# Patient Record
Sex: MALE | Race: BLACK OR AFRICAN AMERICAN
[De-identification: names, ages, dates, MRNs, and addresses within clinical notes are randomized per-mention and may not be internally consistent; named-entity substitution may affect disease eponyms.]

---

## 2017-03-20 ENCOUNTER — HOSPITAL ENCOUNTER (INPATIENT)
Dept: HOSPITAL 23 - P2S | Age: 22
LOS: 2 days | DRG: 897 | End: 2017-03-22
Admitting: PSYCHIATRY & NEUROLOGY
Payer: COMMERCIAL

## 2017-03-20 DIAGNOSIS — G89.29: ICD-10-CM

## 2017-03-20 DIAGNOSIS — F39: ICD-10-CM

## 2017-03-20 DIAGNOSIS — F11.20: Primary | ICD-10-CM

## 2017-03-20 DIAGNOSIS — M54.9: ICD-10-CM

## 2017-03-20 DIAGNOSIS — F32.9: ICD-10-CM

## 2017-03-20 PROCEDURE — HZ2ZZZZ DETOXIFICATION SERVICES FOR SUBSTANCE ABUSE TREATMENT: ICD-10-PCS | Performed by: PSYCHIATRY & NEUROLOGY

## 2017-03-21 LAB
BARBITURATES UR QL SCN: 0.2 MG/DL (ref 0.2–2)
BARBITURATES UR QL SCN: 3.9 G/DL (ref 3.5–5)
BASOPHIL#: 0 X10E3 (ref 0–0.3)
BASOPHIL%: 0.3 % (ref 0–2.5)
BENZODIAZ UR QL SCN: 14 U/L (ref 10–40)
BENZODIAZ UR QL SCN: 15 U/L (ref 10–42)
BLOOD UREA NITROGEN: 10 MG/DL (ref 9–23)
BUN/CREATININE RATIO: 9.09
BZE UR QL SCN: 73 U/L (ref 32–92)
CALCIUM SERUM: 9.7 MG/DL (ref 8.4–10.2)
CK MB SERPL-RTO: 13.2 % (ref 11–15.5)
CK MB SERPL-RTO: 33.3 G/DL (ref 30–36)
CREATININE SERUM: 1.1 MG/DL (ref 0.6–1.4)
DIFF IND: NO
EOSINOPHIL#: 0.1 X10E3 (ref 0–0.7)
EOSINOPHIL%: 1 % (ref 0–7)
GLOM FILT RATE ESTIMATED: (no result) ML/MIN (ref 60–?)
GLUCOSE FASTING: 95 MG/DL (ref 70–110)
HEMATOCRIT: 42.3 % (ref 38–50)
HEMOGLOBIN: 14.1 GM/DL (ref 13–16)
KETONES UR QL: 105 MMOL/L (ref 100–111)
KETONES UR QL: 29 MMOL/L (ref 22–31)
LYMPHOCYTE#: 1.8 X10E3 (ref 1–3.5)
LYMPHOCYTE%: 22.8 % (ref 17–45)
MEAN CELL VOLUME: 91.2 FL (ref 83–96)
MEAN CORPUSCULAR HEMOGLOBIN: 30.4 PG (ref 28–34)
MEAN PLATELET VOLUME: 8 FL (ref 6.5–11.5)
MONOCYTE#: 1.5 X10E3 (ref 0–1)
MONOCYTE%: 18.1 % (ref 3–12)
NEUTROPHIL#: 4.7 X10E3 (ref 1.5–7.1)
NEUTROPHIL%: 57.8 % (ref 40–75)
PLATELET COUNT: 314 X10E3 (ref 140–420)
POTASSIUM: 4.4 MMOL/L (ref 3.5–5.1)
PROTEIN TOTAL SERUM: 6.7 G/DL (ref 6–8.3)
RED BLOOD COUNT: 4.64 X10E (ref 3.9–5.6)
SODIUM: 139 MMOL/L (ref 135–145)
WHITE BLOOD COUNT: 8.1 X10E3 (ref 4–10.5)

## 2017-05-02 ENCOUNTER — HOSPITAL ENCOUNTER (INPATIENT)
Dept: HOSPITAL 23 - P1E | Age: 22
LOS: 2 days | DRG: 885 | End: 2017-05-04
Admitting: PSYCHIATRY & NEUROLOGY
Payer: COMMERCIAL

## 2017-05-02 DIAGNOSIS — F11.20: ICD-10-CM

## 2017-05-02 DIAGNOSIS — F32.9: ICD-10-CM

## 2017-05-02 DIAGNOSIS — F39: Primary | ICD-10-CM

## 2017-05-02 PROCEDURE — HZ2ZZZZ DETOXIFICATION SERVICES FOR SUBSTANCE ABUSE TREATMENT: ICD-10-PCS | Performed by: PSYCHIATRY & NEUROLOGY

## 2017-05-03 LAB
ANISOCYTOSIS: (no result)
BARBITURATES UR QL SCN: 0.4 MG/DL (ref 0.2–2)
BARBITURATES UR QL SCN: 3.9 G/DL (ref 3.5–5)
BASOPHIL#: 0 X10E3 (ref 0–0.3)
BASOPHIL%: 0.4 % (ref 0–2.5)
BENZODIAZ UR QL SCN: 42 U/L (ref 10–42)
BENZODIAZ UR QL SCN: 43 U/L (ref 10–40)
BLOOD UREA NITROGEN: 12 MG/DL (ref 9–23)
BUN/CREATININE RATIO: 10.9
BZE UR QL SCN: 71 U/L (ref 32–92)
CALCIUM SERUM: 9.5 MG/DL (ref 8.4–10.2)
CK MB SERPL-RTO: 13.7 % (ref 11–15.5)
CK MB SERPL-RTO: 32.7 G/DL (ref 30–36)
CREATININE SERUM: 1.1 MG/DL (ref 0.6–1.4)
DIFF IND: YES
EOSINOPHIL#: 0.1 X10E3 (ref 0–0.7)
EOSINOPHIL%: 2.6 % (ref 0–7)
EOSINOPHIL: 1 % (ref 0–7)
GLOM FILT RATE ESTIMATED: 110.7 ML/MIN (ref 60–?)
GLUCOSE FASTING: 139 MG/DL (ref 70–110)
HEMATOCRIT: 43.7 % (ref 38–50)
HEMOGLOBIN: 14.3 GM/DL (ref 13–16)
KETONES UR QL: 105 MMOL/L (ref 100–111)
KETONES UR QL: 27 MMOL/L (ref 22–31)
LYMPHOCYTE#: 1.6 X10E3 (ref 1–3.5)
LYMPHOCYTE%: 40.1 % (ref 17–45)
LYMPHOCYTE: 40 % (ref 17–50)
MEAN CELL VOLUME: 92.2 FL (ref 83–96)
MEAN CORPUSCULAR HEMOGLOBIN: 30.2 PG (ref 28–34)
MEAN PLATELET VOLUME: 8.5 FL (ref 6.5–11.5)
MONOCYTE#: 0.8 X10E3 (ref 0–1)
MONOCYTE%: 20.8 % (ref 3–12)
MONOCYTE: 26 % (ref 3–12)
NEUTROPHIL#: 1.5 X10E3 (ref 1.5–7.1)
NEUTROPHIL%: 36.1 % (ref 40–75)
NEUTROPHIL: 33 % (ref 40–75)
PLATELET COUNT: 218 X10E3 (ref 140–420)
PLATELET ESTIMATE: NORMAL
POTASSIUM: 4.1 MMOL/L (ref 3.5–5.1)
PROTEIN TOTAL SERUM: 6.5 G/DL (ref 6–8.3)
RBC NORMAL: YES
RED BLOOD COUNT: 4.74 X10E (ref 3.9–5.6)
SODIUM: 140 MMOL/L (ref 135–145)
WHITE BLOOD COUNT: 4 X10E3 (ref 4–10.5)

## 2017-05-04 LAB
BARBITURATES: (no result)
BENZODIAZEPINES: (no result)
COCAINE: (no result)
DX ICD CODE: (no result)
DX ICD CODE: (no result)
OPIATES: (no result)
TRICYCLIC ANTIDEPRESSANTS: (no result)
U METHADONE: (no result)
URINE APPEARANCE: CLEAR
URINE BILIRUBIN: (no result)
URINE BLOOD: (no result)
URINE COLOR: YELLOW
URINE GLUCOSE: (no result) MG/DL
URINE KETONE: (no result)
URINE LEUKOCYTE ESTERASE: (no result)
URINE NITRATE: (no result)
URINE PH: 8.5 (ref 5–8)
URINE PROTEIN: (no result)
URINE SOURCE: (no result)
URINE SPECIFIC GRAVITY: 1.02 (ref 1–1.03)
URINE UROBILINOGEN: 0.2 MG/DL

## 2017-07-11 ENCOUNTER — HOSPITAL ENCOUNTER (INPATIENT)
Dept: HOSPITAL 23 - P1E | Age: 22
LOS: 3 days | Discharge: LEFT BEFORE DISCHARGE | DRG: 894 | End: 2017-07-14
Attending: SPECIALIST | Admitting: SPECIALIST
Payer: COMMERCIAL

## 2017-07-11 DIAGNOSIS — F17.210: ICD-10-CM

## 2017-07-11 DIAGNOSIS — F11.24: ICD-10-CM

## 2017-07-11 DIAGNOSIS — F11.23: Primary | ICD-10-CM

## 2017-07-11 DIAGNOSIS — F13.20: ICD-10-CM

## 2017-07-11 PROCEDURE — HZ2ZZZZ DETOXIFICATION SERVICES FOR SUBSTANCE ABUSE TREATMENT: ICD-10-PCS | Performed by: SPECIALIST

## 2017-07-12 LAB
BARBITURATES UR QL SCN: 0.5 MG/DL (ref 0.2–2)
BARBITURATES UR QL SCN: 4.3 G/DL (ref 3.5–5)
BASOPHIL#: 0 X10E3 (ref 0–0.3)
BASOPHIL%: 0.3 % (ref 0–2.5)
BENZODIAZ UR QL SCN: 24 U/L (ref 10–40)
BENZODIAZ UR QL SCN: 26 U/L (ref 10–42)
BLOOD UREA NITROGEN: 7 MG/DL (ref 9–23)
BUN/CREATININE RATIO: 10
BZE UR QL SCN: 92 U/L (ref 32–92)
CALCIUM SERUM: 9.8 MG/DL (ref 8.4–10.2)
CK MB SERPL-RTO: 13.3 % (ref 11–15.5)
CK MB SERPL-RTO: 33.1 G/DL (ref 30–36)
CREATININE SERUM: 0.7 MG/DL (ref 0.6–1.4)
DIFF IND: NO
EOSINOPHIL#: 0.1 X10E3 (ref 0–0.7)
EOSINOPHIL%: 1.7 % (ref 0–7)
GLOM FILT RATE ESTIMATED: 155.3 ML/MIN (ref 60–?)
GLUCOSE FASTING: 87 MG/DL (ref 70–110)
HEMATOCRIT: 43.6 % (ref 38–50)
HEMOGLOBIN: 14.4 GM/DL (ref 13–16)
KETONES UR QL: 100 MMOL/L (ref 100–111)
KETONES UR QL: 28 MMOL/L (ref 22–31)
LYMPHOCYTE#: 2.5 X10E3 (ref 1–3.5)
LYMPHOCYTE%: 33.8 % (ref 17–45)
MEAN CELL VOLUME: 91.8 FL (ref 83–96)
MEAN CORPUSCULAR HEMOGLOBIN: 30.4 PG (ref 28–34)
MEAN PLATELET VOLUME: 8.4 FL (ref 6.5–11.5)
MONOCYTE#: 1.1 X10E3 (ref 0–1)
MONOCYTE%: 15.2 % (ref 3–12)
NEUTROPHIL#: 3.7 X10E3 (ref 1.5–7.1)
NEUTROPHIL%: 49 % (ref 40–75)
PLATELET COUNT: 291 X10E3 (ref 140–420)
POTASSIUM: 4.2 MMOL/L (ref 3.5–5.1)
PROTEIN TOTAL SERUM: 7.1 G/DL (ref 6–8.3)
RED BLOOD COUNT: 4.75 X10E (ref 3.9–5.6)
SODIUM: 135 MMOL/L (ref 135–145)
WHITE BLOOD COUNT: 7.5 X10E3 (ref 4–10.5)

## 2017-07-14 LAB
BARBITURATES: (no result)
BENZODIAZEPINES: (no result)
COCAINE: (no result)
DX ICD CODE: (no result)
DX ICD CODE: (no result)
OPIATES: (no result)
TRICYCLIC ANTIDEPRESSANTS: (no result)
U HYALINE CASTS AUWI: (no result) /[LPF]
U METHADONE: (no result)
URBCS1 AUWI: (no result) /[HPF] (ref 0–2)
URINE APPEARANCE: CLEAR
URINE BACTERIA AUWI: (no result)
URINE BILIRUBIN: (no result)
URINE BLOOD: (no result)
URINE COLOR: (no result)
URINE GLUCOSE: (no result) MG/DL
URINE KETONE: (no result)
URINE LEUKOCYTE ESTERASE: (no result)
URINE NITRATE: (no result)
URINE PH: 6.5 (ref 5–8)
URINE PROTEIN: (no result)
URINE SOURCE: (no result)
URINE SPECIFIC GRAVITY: 1.03 (ref 1–1.03)
URINE SQUAMOUS EPITHELIAL CELL: (no result) /[HPF]
URINE UROBILINOGEN: 0.2 MG/DL
UWBCS1 AUWI: (no result) (ref 0–5)

## 2020-10-02 ENCOUNTER — OFFICE VISIT CONVERTED (OUTPATIENT)
Dept: FAMILY MEDICINE CLINIC | Facility: CLINIC | Age: 25
End: 2020-10-02
Attending: PHYSICIAN ASSISTANT

## 2020-10-12 ENCOUNTER — OFFICE VISIT CONVERTED (OUTPATIENT)
Dept: CARDIOLOGY | Facility: CLINIC | Age: 25
End: 2020-10-12
Attending: INTERNAL MEDICINE

## 2020-10-12 ENCOUNTER — CONVERSION ENCOUNTER (OUTPATIENT)
Dept: OTHER | Facility: HOSPITAL | Age: 25
End: 2020-10-12

## 2020-10-14 ENCOUNTER — HOSPITAL ENCOUNTER (OUTPATIENT)
Dept: CARDIOLOGY | Facility: HOSPITAL | Age: 25
Discharge: HOME | End: 2020-10-14
Attending: INTERNAL MEDICINE

## 2021-05-10 NOTE — H&P
History and Physical      Patient Name: Benjie Ogden   Patient ID: [id number]   Sex: Male   YOB: 1995    Primary Care Provider: Raven CORTES   Referring Provider: Raven CORTES    Visit Date: October 2, 2020    Provider: SEBASTIAN Renee   Location: [facility]   Location Address: [address] [place] [address], [address]  [place], [place]  [id number]   Location Phone: [phone]          Chief Complaint  · New Patient-Establish Care       History Of Present Illness  Benjie Ogden is a 25 year old /Black male who presents for evaluation and treatment of:      New patient to establish care, he is currently staying at [facility]; Clean for about 2-3 months. Previous drugs of choice Percocet, codeine, heroin, Lortab.    CHF: He is currently on Carvedilol and Lisinopril. He was originally diagnosed 1 year ago in [facility]. He was seen by Dr Davis a few weeks ago. He states he does have CP/SOA but has been seen by [facility] ER. He is scheduled for testing for further workup by Dr Davis. He also has appt for second opinion with different cardiologist but unsure of appt time or doctor.    Patient is currently on Seroquel and Hydroxyzine; unsure of diagnosis but seeing psych through the [facility]       Past Medical History  Disease Name Date Onset Notes   Bipolar disorder --  --    CHF (congestive heart failure) --  --    Depression with anxiety --  --    Essential hypertension --  --          Medication List  Name Date Started Instructions   carvedilol 6.25 mg oral tablet  take 1 tablet (6.25 mg) by oral route 2 times per day with food   hydroxyzine HCl 50 mg oral tablet  take 1 tablet by oral route 4 times a day   lisinopril 20 mg oral tablet  take 1 tablet (20 mg) by oral route once daily   Seroquel  mg oral tablet extended release 24 hr  take 1 tablet (150 mg) by oral route once daily in the evening without food or with a light meal  "        Allergy List  Allergen Name Date Reaction Notes   NO KNOWN DRUG ALLERGIES --  --  --        Allergies Reconciled  Social History  Finding Status Start/Stop Quantity Notes   Alcohol Former --/-- --  --    Tobacco Current every day 15/-- 1PPD --          Review of Systems  · Constitutional  o Denies  o : fatigue, night sweats  · Eyes  o Denies  o : double vision, blurred vision  · HENT  o Denies  o : vertigo, recent head injury  · Breasts  o Denies  o : abnormal changes in breast size, additional breast symptoms except as noted in the HPI  · Cardiovascular  o Admits  o : chest pain  o Denies  o : irregular heart beats  · Respiratory  o Admits  o : shortness of breath  o Denies  o : productive cough  · Gastrointestinal  o Denies  o : nausea, vomiting  · Genitourinary  o Denies  o : dysuria, urinary retention  · Integument  o Denies  o : hair growth change, new skin lesions  · Neurologic  o Denies  o : altered mental status, seizures  · Musculoskeletal  o Denies  o : joint swelling, limitation of motion  · Endocrine  o Denies  o : cold intolerance, heat intolerance  · Heme-Lymph  o Denies  o : petechiae, lymph node enlargement or tenderness  · Allergic-Immunologic  o Denies  o : frequent illnesses      Vitals  Date Time BP Position Site L\R Cuff Size HR RR TEMP (F) WT  HT  BMI kg/m2 BSA m2 O2 Sat FR L/min FiO2 HC       10/02/2020 12:25 /80 Sitting    79 - R  98.9 221lbs 8oz 6'  2\" 28.44 2.29 95 %  21%          Physical Examination  · Constitutional  o Appearance  o : well developed, well-nourished, no acute distress  · Head and Face  o Head  o : normocephalic, atraumatic  · Ears, Nose, Mouth and Throat  o Ears  o :   § External Ears  § : external auditory canal appearance normal, no discharge present  § Otoscopic Examination  § : tympanic membranes pearly white/gray bilaterally  o Nose  o :   § External Nose  § : no lesions noted  § Nasopharynx  § : no discharge present  o Oral Cavity  o :   § Oral " Mucosa  § : oral mucosa light pink  o Throat  o :   § Oropharynx  § : tonsils without exudate, no palatal petechiae  · Neck  o Inspection/Palpation  o : normal appearance, no masses or tenderness, trachea midline  o Thyroid  o : gland size normal, nontender, no nodules or masses present on palpation  · Respiratory  o Respiratory Effort  o : breathing unlabored  o Inspection of Chest  o : chest rise symmetric bilaterally  o Auscultation of Lungs  o : clear to auscultation bilaterally throughout inspiration and expiration  · Cardiovascular  o Heart  o :   § Auscultation of Heart  § : regular rate and rhythm, no murmurs, gallops or rubs  o Peripheral Vascular System  o :   § Extremities  § : no edema  · Lymphatic  o Neck  o : no cervical lymphadenopathy, no supraclavicular lymphadenopathy  · Psychiatric  o Mood and Affect  o : mood normal, affect appropriate          Assessment  · Screening for depression     V79.0/Z13.89  · CHF (congestive heart failure)     428.0/I50.9  · Nicotine dependence     305.1/F17.200  · Establishing care with new doctor, encounter for     V65.8/Z76.89  · History of drug abuse     305.93/F19.11       Patient is currently seeing cardiology and did not need any medication refills today; will obtain previous ER records to review.     Problems Reconciled  Plan  · Orders  o ACO-18: Positive screen for clinical depression using a standardized tool and a follow-up plan documented () - V79.0/Z13.89 - 10/02/2020   Pt is seeing psych through [facility]  o ACO-17: Screened for tobacco use AND received tobacco cessation intervention (4004F) - 305.1/F17.200 - 10/02/2020  o ACO-39: Current medications updated and reviewed (1159F, ) - - 10/02/2020  · Medications  o Medications have been Reconciled  o Transition of Care or Provider Policy  · Instructions  o Depression Screen completed and scanned into the EMR under the designated folder within the patient's documents.  o Today's PHQ-9 result  is 13  o *Form of nicotine being used: cigarettes  o Patient was strongly encouraged to discontinue use of any nicotine containing product or minimize the use of the product.  o Patient was educated/instructed on their diagnosis, treatment and medications prior to discharge from the clinic today.  o Patient counseled to stop smoking.  o Patient instructed to seek medical attention urgently for new or worsening symptoms.  o Call the office with any concerns or questions.  o Discussed Covid-19 precautions including, but not limited to, social distancing, avoid touching your face, and hand washing.   o Electronically Identified Patient Education Materials Provided Electronically  · Disposition  o Call or Return if symptoms worsen or persist.  o Follow Up PRN.            Electronically Signed by: SEBASTIAN Renee -Author on October 6, 2020 11:46:09 AM
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
## 2021-05-14 VITALS
DIASTOLIC BLOOD PRESSURE: 86 MMHG | BODY MASS INDEX: 28.36 KG/M2 | SYSTOLIC BLOOD PRESSURE: 130 MMHG | WEIGHT: 221 LBS | HEIGHT: 74 IN | HEART RATE: 84 BPM

## 2021-05-14 VITALS
BODY MASS INDEX: 28.43 KG/M2 | WEIGHT: 221.5 LBS | SYSTOLIC BLOOD PRESSURE: 127 MMHG | TEMPERATURE: 98.9 F | HEART RATE: 79 BPM | HEIGHT: 74 IN | DIASTOLIC BLOOD PRESSURE: 80 MMHG | OXYGEN SATURATION: 95 %